# Patient Record
Sex: FEMALE | Race: WHITE | NOT HISPANIC OR LATINO | URBAN - METROPOLITAN AREA
[De-identification: names, ages, dates, MRNs, and addresses within clinical notes are randomized per-mention and may not be internally consistent; named-entity substitution may affect disease eponyms.]

---

## 2017-05-08 ENCOUNTER — EMERGENCY (EMERGENCY)
Facility: HOSPITAL | Age: 22
LOS: 0 days | Discharge: HOME | End: 2017-05-08
Admitting: PEDIATRICS

## 2017-06-28 DIAGNOSIS — R10.84 GENERALIZED ABDOMINAL PAIN: ICD-10-CM

## 2017-06-28 DIAGNOSIS — R10.13 EPIGASTRIC PAIN: ICD-10-CM

## 2018-03-13 ENCOUNTER — TRANSCRIPTION ENCOUNTER (OUTPATIENT)
Age: 23
End: 2018-03-13

## 2018-12-17 ENCOUNTER — INPATIENT (INPATIENT)
Facility: HOSPITAL | Age: 23
LOS: 0 days | Discharge: HOME | End: 2018-12-17
Attending: OBSTETRICS & GYNECOLOGY | Admitting: OBSTETRICS & GYNECOLOGY

## 2018-12-17 ENCOUNTER — TRANSCRIPTION ENCOUNTER (OUTPATIENT)
Age: 23
End: 2018-12-17

## 2018-12-17 VITALS
RESPIRATION RATE: 18 BRPM | SYSTOLIC BLOOD PRESSURE: 102 MMHG | TEMPERATURE: 96 F | HEART RATE: 68 BPM | OXYGEN SATURATION: 98 % | WEIGHT: 113.98 LBS | DIASTOLIC BLOOD PRESSURE: 63 MMHG

## 2018-12-17 VITALS
HEART RATE: 65 BPM | RESPIRATION RATE: 18 BRPM | DIASTOLIC BLOOD PRESSURE: 63 MMHG | SYSTOLIC BLOOD PRESSURE: 97 MMHG | OXYGEN SATURATION: 98 %

## 2018-12-17 LAB
ALBUMIN SERPL ELPH-MCNC: 4.1 G/DL — SIGNIFICANT CHANGE UP (ref 3.5–5.2)
ALP SERPL-CCNC: 42 U/L — SIGNIFICANT CHANGE UP (ref 30–115)
ALT FLD-CCNC: 12 U/L — SIGNIFICANT CHANGE UP (ref 0–41)
ANION GAP SERPL CALC-SCNC: 13 MMOL/L — SIGNIFICANT CHANGE UP (ref 7–14)
APPEARANCE UR: ABNORMAL
AST SERPL-CCNC: 18 U/L — SIGNIFICANT CHANGE UP (ref 0–41)
BACTERIA # UR AUTO: ABNORMAL /HPF
BASE EXCESS BLDV CALC-SCNC: -1.2 MMOL/L — SIGNIFICANT CHANGE UP (ref -2–2)
BASOPHILS # BLD AUTO: 0.04 K/UL — SIGNIFICANT CHANGE UP (ref 0–0.2)
BASOPHILS # BLD AUTO: 0.06 K/UL — SIGNIFICANT CHANGE UP (ref 0–0.2)
BASOPHILS NFR BLD AUTO: 0.3 % — SIGNIFICANT CHANGE UP (ref 0–1)
BASOPHILS NFR BLD AUTO: 0.4 % — SIGNIFICANT CHANGE UP (ref 0–1)
BILIRUB DIRECT SERPL-MCNC: <0.2 MG/DL — SIGNIFICANT CHANGE UP (ref 0–0.2)
BILIRUB INDIRECT FLD-MCNC: >0.2 MG/DL — SIGNIFICANT CHANGE UP (ref 0.2–1.2)
BILIRUB SERPL-MCNC: 0.4 MG/DL — SIGNIFICANT CHANGE UP (ref 0.2–1.2)
BILIRUB UR-MCNC: NEGATIVE — SIGNIFICANT CHANGE UP
BLD GP AB SCN SERPL QL: SIGNIFICANT CHANGE UP
BUN SERPL-MCNC: 12 MG/DL — SIGNIFICANT CHANGE UP (ref 10–20)
CA-I SERPL-SCNC: 1.23 MMOL/L — SIGNIFICANT CHANGE UP (ref 1.12–1.3)
CALCIUM SERPL-MCNC: 9.2 MG/DL — SIGNIFICANT CHANGE UP (ref 8.5–10.1)
CHLORIDE SERPL-SCNC: 102 MMOL/L — SIGNIFICANT CHANGE UP (ref 98–110)
CO2 SERPL-SCNC: 25 MMOL/L — SIGNIFICANT CHANGE UP (ref 17–32)
COLOR SPEC: SIGNIFICANT CHANGE UP
CREAT SERPL-MCNC: 0.6 MG/DL — LOW (ref 0.7–1.5)
DIFF PNL FLD: NEGATIVE — SIGNIFICANT CHANGE UP
EOSINOPHIL # BLD AUTO: 0.04 K/UL — SIGNIFICANT CHANGE UP (ref 0–0.7)
EOSINOPHIL # BLD AUTO: 0.06 K/UL — SIGNIFICANT CHANGE UP (ref 0–0.7)
EOSINOPHIL NFR BLD AUTO: 0.3 % — SIGNIFICANT CHANGE UP (ref 0–8)
EOSINOPHIL NFR BLD AUTO: 0.4 % — SIGNIFICANT CHANGE UP (ref 0–8)
ERYTHROCYTE [SEDIMENTATION RATE] IN BLOOD: 2 MM/HR — SIGNIFICANT CHANGE UP (ref 0–15)
GAS PNL BLDV: 139 MMOL/L — SIGNIFICANT CHANGE UP (ref 136–145)
GAS PNL BLDV: SIGNIFICANT CHANGE UP
GLUCOSE SERPL-MCNC: 122 MG/DL — HIGH (ref 70–99)
GLUCOSE UR QL: NEGATIVE — SIGNIFICANT CHANGE UP
HCO3 BLDV-SCNC: 24 MMOL/L — SIGNIFICANT CHANGE UP (ref 22–29)
HCT VFR BLD CALC: 32.5 % — LOW (ref 37–47)
HCT VFR BLD CALC: 34.9 % — LOW (ref 37–47)
HCT VFR BLD CALC: 38.3 % — SIGNIFICANT CHANGE UP (ref 37–47)
HCT VFR BLDA CALC: 37.2 % — SIGNIFICANT CHANGE UP (ref 34–44)
HGB BLD CALC-MCNC: 12.1 G/DL — LOW (ref 14–18)
HGB BLD-MCNC: 10.7 G/DL — LOW (ref 12–16)
HGB BLD-MCNC: 11.4 G/DL — LOW (ref 12–16)
HGB BLD-MCNC: 13 G/DL — SIGNIFICANT CHANGE UP (ref 12–16)
IMM GRANULOCYTES NFR BLD AUTO: 0.3 % — SIGNIFICANT CHANGE UP (ref 0.1–0.3)
IMM GRANULOCYTES NFR BLD AUTO: 0.4 % — HIGH (ref 0.1–0.3)
KETONES UR-MCNC: 40
LACTATE BLDV-MCNC: 1.2 MMOL/L — SIGNIFICANT CHANGE UP (ref 0.5–1.6)
LACTATE SERPL-SCNC: 0.7 MMOL/L — SIGNIFICANT CHANGE UP (ref 0.5–2.2)
LEUKOCYTE ESTERASE UR-ACNC: ABNORMAL
LIDOCAIN IGE QN: 27 U/L — SIGNIFICANT CHANGE UP (ref 7–60)
LYMPHOCYTES # BLD AUTO: 1.47 K/UL — SIGNIFICANT CHANGE UP (ref 1.2–3.4)
LYMPHOCYTES # BLD AUTO: 1.98 K/UL — SIGNIFICANT CHANGE UP (ref 1.2–3.4)
LYMPHOCYTES # BLD AUTO: 10.5 % — LOW (ref 20.5–51.1)
LYMPHOCYTES # BLD AUTO: 16.2 % — LOW (ref 20.5–51.1)
MCHC RBC-ENTMCNC: 28.9 PG — SIGNIFICANT CHANGE UP (ref 27–31)
MCHC RBC-ENTMCNC: 29.6 PG — SIGNIFICANT CHANGE UP (ref 27–31)
MCHC RBC-ENTMCNC: 29.9 PG — SIGNIFICANT CHANGE UP (ref 27–31)
MCHC RBC-ENTMCNC: 32.7 G/DL — SIGNIFICANT CHANGE UP (ref 32–37)
MCHC RBC-ENTMCNC: 32.9 G/DL — SIGNIFICANT CHANGE UP (ref 32–37)
MCHC RBC-ENTMCNC: 33.9 G/DL — SIGNIFICANT CHANGE UP (ref 32–37)
MCV RBC AUTO: 88 FL — SIGNIFICANT CHANGE UP (ref 81–99)
MCV RBC AUTO: 88.6 FL — SIGNIFICANT CHANGE UP (ref 81–99)
MCV RBC AUTO: 90 FL — SIGNIFICANT CHANGE UP (ref 81–99)
MONOCYTES # BLD AUTO: 0.77 K/UL — HIGH (ref 0.1–0.6)
MONOCYTES # BLD AUTO: 0.82 K/UL — HIGH (ref 0.1–0.6)
MONOCYTES NFR BLD AUTO: 5.5 % — SIGNIFICANT CHANGE UP (ref 1.7–9.3)
MONOCYTES NFR BLD AUTO: 6.7 % — SIGNIFICANT CHANGE UP (ref 1.7–9.3)
NEUTROPHILS # BLD AUTO: 11.62 K/UL — HIGH (ref 1.4–6.5)
NEUTROPHILS # BLD AUTO: 9.34 K/UL — HIGH (ref 1.4–6.5)
NEUTROPHILS NFR BLD AUTO: 76.2 % — HIGH (ref 42.2–75.2)
NEUTROPHILS NFR BLD AUTO: 82.8 % — HIGH (ref 42.2–75.2)
NITRITE UR-MCNC: NEGATIVE — SIGNIFICANT CHANGE UP
NRBC # BLD: 0 /100 WBCS — SIGNIFICANT CHANGE UP (ref 0–0)
PCO2 BLDV: 42 MMHG — SIGNIFICANT CHANGE UP (ref 41–51)
PH BLDV: 7.36 — SIGNIFICANT CHANGE UP (ref 7.26–7.43)
PH UR: 5.5 — SIGNIFICANT CHANGE UP (ref 5–8)
PLATELET # BLD AUTO: 137 K/UL — SIGNIFICANT CHANGE UP (ref 130–400)
PLATELET # BLD AUTO: 196 K/UL — SIGNIFICANT CHANGE UP (ref 130–400)
PLATELET # BLD AUTO: 229 K/UL — SIGNIFICANT CHANGE UP (ref 130–400)
PO2 BLDV: 36 MMHG — SIGNIFICANT CHANGE UP (ref 20–40)
POTASSIUM BLDV-SCNC: 3.9 MMOL/L — SIGNIFICANT CHANGE UP (ref 3.3–5.6)
POTASSIUM SERPL-MCNC: 4.1 MMOL/L — SIGNIFICANT CHANGE UP (ref 3.5–5)
POTASSIUM SERPL-SCNC: 4.1 MMOL/L — SIGNIFICANT CHANGE UP (ref 3.5–5)
PROT SERPL-MCNC: 6.7 G/DL — SIGNIFICANT CHANGE UP (ref 6–8)
PROT UR-MCNC: ABNORMAL
RBC # BLD: 3.61 M/UL — LOW (ref 4.2–5.4)
RBC # BLD: 3.94 M/UL — LOW (ref 4.2–5.4)
RBC # BLD: 4.35 M/UL — SIGNIFICANT CHANGE UP (ref 4.2–5.4)
RBC # FLD: 11.8 % — SIGNIFICANT CHANGE UP (ref 11.5–14.5)
RBC # FLD: 11.9 % — SIGNIFICANT CHANGE UP (ref 11.5–14.5)
RBC # FLD: 11.9 % — SIGNIFICANT CHANGE UP (ref 11.5–14.5)
SAO2 % BLDV: 67 % — SIGNIFICANT CHANGE UP
SODIUM SERPL-SCNC: 140 MMOL/L — SIGNIFICANT CHANGE UP (ref 135–146)
SP GR SPEC: >=1.03 — SIGNIFICANT CHANGE UP (ref 1.01–1.03)
TYPE + AB SCN PNL BLD: SIGNIFICANT CHANGE UP
UROBILINOGEN FLD QL: 0.2 — SIGNIFICANT CHANGE UP (ref 0.2–0.2)
WBC # BLD: 12.26 K/UL — HIGH (ref 4.8–10.8)
WBC # BLD: 14.04 K/UL — HIGH (ref 4.8–10.8)
WBC # BLD: 14.55 K/UL — HIGH (ref 4.8–10.8)
WBC # FLD AUTO: 12.26 K/UL — HIGH (ref 4.8–10.8)
WBC # FLD AUTO: 14.04 K/UL — HIGH (ref 4.8–10.8)
WBC # FLD AUTO: 14.55 K/UL — HIGH (ref 4.8–10.8)
WBC UR QL: SIGNIFICANT CHANGE UP /HPF

## 2018-12-17 RX ORDER — SODIUM CHLORIDE 9 MG/ML
1000 INJECTION, SOLUTION INTRAVENOUS
Qty: 0 | Refills: 0 | Status: DISCONTINUED | OUTPATIENT
Start: 2018-12-17 | End: 2018-12-17

## 2018-12-17 RX ORDER — ONDANSETRON 8 MG/1
4 TABLET, FILM COATED ORAL ONCE
Qty: 0 | Refills: 0 | Status: DISCONTINUED | OUTPATIENT
Start: 2018-12-17 | End: 2018-12-17

## 2018-12-17 RX ORDER — CEFOTETAN DISODIUM 1 G
2 VIAL (EA) INJECTION ONCE
Qty: 0 | Refills: 0 | Status: COMPLETED | OUTPATIENT
Start: 2018-12-17 | End: 2018-12-17

## 2018-12-17 RX ORDER — KETOROLAC TROMETHAMINE 30 MG/ML
30 SYRINGE (ML) INJECTION ONCE
Qty: 0 | Refills: 0 | Status: DISCONTINUED | OUTPATIENT
Start: 2018-12-17 | End: 2018-12-17

## 2018-12-17 RX ORDER — DOCUSATE SODIUM 100 MG
1 CAPSULE ORAL
Qty: 60 | Refills: 1 | OUTPATIENT
Start: 2018-12-17 | End: 2019-02-14

## 2018-12-17 RX ORDER — MORPHINE SULFATE 50 MG/1
4 CAPSULE, EXTENDED RELEASE ORAL
Qty: 0 | Refills: 0 | Status: DISCONTINUED | OUTPATIENT
Start: 2018-12-17 | End: 2018-12-17

## 2018-12-17 RX ORDER — DEXAMETHASONE 0.5 MG/5ML
8 ELIXIR ORAL ONCE
Qty: 0 | Refills: 0 | Status: DISCONTINUED | OUTPATIENT
Start: 2018-12-17 | End: 2018-12-17

## 2018-12-17 RX ORDER — ONDANSETRON 8 MG/1
4 TABLET, FILM COATED ORAL ONCE
Qty: 0 | Refills: 0 | Status: COMPLETED | OUTPATIENT
Start: 2018-12-17 | End: 2018-12-17

## 2018-12-17 RX ORDER — MORPHINE SULFATE 50 MG/1
2 CAPSULE, EXTENDED RELEASE ORAL
Qty: 0 | Refills: 0 | Status: DISCONTINUED | OUTPATIENT
Start: 2018-12-17 | End: 2018-12-17

## 2018-12-17 RX ORDER — METRONIDAZOLE 500 MG
500 TABLET ORAL ONCE
Qty: 0 | Refills: 0 | Status: COMPLETED | OUTPATIENT
Start: 2018-12-17 | End: 2018-12-17

## 2018-12-17 RX ORDER — MEPERIDINE HYDROCHLORIDE 50 MG/ML
12.5 INJECTION INTRAMUSCULAR; INTRAVENOUS; SUBCUTANEOUS
Qty: 0 | Refills: 0 | Status: DISCONTINUED | OUTPATIENT
Start: 2018-12-17 | End: 2018-12-17

## 2018-12-17 RX ORDER — SODIUM CHLORIDE 9 MG/ML
1000 INJECTION, SOLUTION INTRAVENOUS ONCE
Qty: 0 | Refills: 0 | Status: COMPLETED | OUTPATIENT
Start: 2018-12-17 | End: 2018-12-17

## 2018-12-17 RX ORDER — KETOROLAC TROMETHAMINE 30 MG/ML
15 SYRINGE (ML) INJECTION ONCE
Qty: 0 | Refills: 0 | Status: DISCONTINUED | OUTPATIENT
Start: 2018-12-17 | End: 2018-12-17

## 2018-12-17 RX ADMIN — Medication 100 GRAM(S): at 12:42

## 2018-12-17 RX ADMIN — Medication 110 MILLIGRAM(S): at 13:18

## 2018-12-17 RX ADMIN — ONDANSETRON 4 MILLIGRAM(S): 8 TABLET, FILM COATED ORAL at 03:55

## 2018-12-17 RX ADMIN — SODIUM CHLORIDE 120 MILLILITER(S): 9 INJECTION, SOLUTION INTRAVENOUS at 17:11

## 2018-12-17 RX ADMIN — Medication 30 MILLIGRAM(S): at 03:56

## 2018-12-17 RX ADMIN — Medication 15 MILLIGRAM(S): at 08:23

## 2018-12-17 RX ADMIN — SODIUM CHLORIDE 1000 MILLILITER(S): 9 INJECTION, SOLUTION INTRAVENOUS at 03:55

## 2018-12-17 RX ADMIN — Medication 100 MILLIGRAM(S): at 14:33

## 2018-12-17 NOTE — CONSULT NOTE ADULT - SUBJECTIVE AND OBJECTIVE BOX
JACKI MUÑOZ   23y   Female      Chief Complaint:    HPI: 23y yo nulligravid LMP x3wks ago presents with RLQ pain.  Pt reports pain started suddenly at 2AM when she was sitting still, 9/10 stabbing type of pain, radiating to her back, a/w 2 episodes of diarrhea.  Denies nausea/vomiting, fevers/chills . Denies vaginal discharge or bleeding.  Denies sick contacts.  Pt does not have history of known ovarian cysts, had a pelvic sonogram 6/2018 with no findings per patient.       PAST MEDICAL & SURGICAL HISTORY:  No pertinent past medical history  No significant past surgical history      OB/GYN HISTORY:     LMP: 11/26/18, regular cycles, denies heavy bleeding moderate pain prior to start of menses  Gyn: denies history of abnormal pap, STI, ovarian cysts, or uterine fibroids  Obstetric: nulligravid  Last Pap smear:  11/2018 normal per patient  Last mammogram: n/a  Last Colonoscopy: n/a    FAMILY HISTORY: noncontributory      SOCIAL HISTORY: Denies cigarette use, alcohol, or other drugs    ALLERGIES: No Known Allergies    MEDICATIONS:   (Floorstock)   1 Each (08:21)    (Floorstock)   1 Each (03:53)    ketorolac   Injectable   15 milliGRAM(s) (08:23)    ketorolac   Injectable   30 milliGRAM(s) (03:56)    lactated ringers Bolus   1000 mL/Hr (03:55)    ondansetron Injectable   4 milliGRAM(s) (03:55)        Review of Systems: see HPI    Vital Signs Last 24 Hrs  T(C): 36.3 (17 Dec 2018 07:35), Max: 37 (17 Dec 2018 05:02)  T(F): 97.4 (17 Dec 2018 07:35), Max: 98.6 (17 Dec 2018 05:02)  HR: 73 (17 Dec 2018 07:35) (68 - 73)  BP: 100/57 (17 Dec 2018 07:35) (100/57 - 102/63)  RR: 18 (17 Dec 2018 07:35) (18 - 18)  SpO2: 98% (17 Dec 2018 07:35) (98% - 98%)    PHYSICAL EXAM:      Constitutional: NAD    Breasts: deferred    Respiratory: CTAB    Cardiovascular: NL S1/S2, no M/R/G    Gastrointestinal: guarding, moderate tenderness diffusely, worse in RL and RUQ, nondistended, guarding, no rebound or rigidity    Pelvic: Normal vulva, normal vagina, no bleeding, Cervix normal, closed, no bleeding, no abnormal discharge, no CMT, Uterus anteverted, normal sized, moderately tender diffusely on bimanual exam  Rectal: deferred    LABS:                        13.0   14.04 )-----------( 229      ( 17 Dec 2018 03:45 )             38.3       12-17    140  |  102  |  12  ----------------------------<  122<H>  4.1   |  25  |  0.6<L>    Ca    9.2      17 Dec 2018 03:45    TPro  6.7  /  Alb  4.1  /  TBili  0.4  /  DBili  <0.2  /  AST  18  /  ALT  12  /  AlkPhos  42  12-17      Urinalysis Basic - ( 17 Dec 2018 03:45 )    Color: Dark Yellow / Appearance: Cloudy / SG: >=1.030 / pH: x  Gluc: x / Ketone: 40  / Bili: Negative / Urobili: 0.2   Blood: x / Protein: Trace / Nitrite: Negative   Leuk Esterase: Trace / RBC: x / WBC 1-2 /HPF   Sq Epi: x / Non Sq Epi: x / Bacteria: Few /HPF      RADIOLOGY & ADDITIONAL STUDIES:  < from: US Pelvis Complete (12.17.18 @ 07:01) >  Findings:  Uterus: 8 cm long axis, 5.1 cm transverse, 3.7 cm AP.  Endometrial stripe: 0.8 mm.  The uterus is normal in morphology and echogenicity.     Within the right adnexa, the there is a complex mass measuring 5.2 x 4.1   x 3.4 cm with internal vascularity.    The left ovary measures 3.1 x 2.6 x 1.4 cm and contains subcentimeter   follicles.    There is an abnormal amount of complex free fluid within the pelvis.      Impression:    5.2cm complex right adnexal mass without visualization of a normal   ovary. Differential diagnosis includes a tubo-ovarian abscess versus   ovarian torsion. Tubo-ovarian abscess is favored as there appears to be   significant vascularity within the mass.    Abnormal amount of complex free fluid within the pelvis.    Normal uterus and left ovary.    < end of copied text > JACKI MUÑOZ   23y   Female      Chief Complaint:    HPI: 23y yo nulligravid LMP x3wks ago presents with RLQ pain.  Pt reports pain started suddenly at 2AM when she was sitting still, 9/10 stabbing type of pain, radiating to her back, a/w 2 episodes of diarrhea.  Denies nausea/vomiting, fevers/chills . Denies vaginal discharge or bleeding.  Denies sick contacts.  Pt does not have history of known ovarian cysts, had a pelvic sonogram 6/2018 with no findings per patient.       PAST MEDICAL & SURGICAL HISTORY:  No pertinent past medical history  No significant past surgical history      OB/GYN HISTORY:     LMP: 11/26/18, regular cycles, denies heavy bleeding moderate pain prior to start of menses  Gyn: denies history of abnormal pap, STI, ovarian cysts, or uterine fibroids  Obstetric: nulligravid  Last Pap smear:  11/2018 normal per patient  Last mammogram: n/a  Last Colonoscopy: n/a    FAMILY HISTORY: noncontributory      SOCIAL HISTORY: Denies cigarette use, alcohol, or other drugs    ALLERGIES: No Known Allergies    MEDICATIONS:   (Floorstock)   1 Each (08:21)    (Floorstock)   1 Each (03:53)    ketorolac   Injectable   15 milliGRAM(s) (08:23)    ketorolac   Injectable   30 milliGRAM(s) (03:56)    lactated ringers Bolus   1000 mL/Hr (03:55)    ondansetron Injectable   4 milliGRAM(s) (03:55)        Review of Systems: see HPI    Vital Signs Last 24 Hrs  T(C): 36.3 (17 Dec 2018 07:35), Max: 37 (17 Dec 2018 05:02)  T(F): 97.4 (17 Dec 2018 07:35), Max: 98.6 (17 Dec 2018 05:02)  HR: 73 (17 Dec 2018 07:35) (68 - 73)  BP: 100/57 (17 Dec 2018 07:35) (100/57 - 102/63)  RR: 18 (17 Dec 2018 07:35) (18 - 18)  SpO2: 98% (17 Dec 2018 07:35) (98% - 98%)    PHYSICAL EXAM:      Constitutional: NAD    Breasts: deferred    Respiratory: CTAB    Cardiovascular: NL S1/S2, no M/R/G    Gastrointestinal: guarding, moderate tenderness diffusely, worse in RL and RUQ, nondistended, guarding, no rebound or rigidity    Pelvic: Normal vulva, normal vagina, no bleeding, Cervix normal, closed, no bleeding, no abnormal discharge, no CMT, GC/Chl and vaginitis cultures collected, Uterus anteverted, normal sized, moderately tender diffusely on bimanual exam    Rectal: deferred    LABS:                        13.0   14.04 )-----------( 229      ( 17 Dec 2018 03:45 )             38.3       12-17    140  |  102  |  12  ----------------------------<  122<H>  4.1   |  25  |  0.6<L>    Ca    9.2      17 Dec 2018 03:45    TPro  6.7  /  Alb  4.1  /  TBili  0.4  /  DBili  <0.2  /  AST  18  /  ALT  12  /  AlkPhos  42  12-17      Urinalysis Basic - ( 17 Dec 2018 03:45 )    Color: Dark Yellow / Appearance: Cloudy / SG: >=1.030 / pH: x  Gluc: x / Ketone: 40  / Bili: Negative / Urobili: 0.2   Blood: x / Protein: Trace / Nitrite: Negative   Leuk Esterase: Trace / RBC: x / WBC 1-2 /HPF   Sq Epi: x / Non Sq Epi: x / Bacteria: Few /HPF      RADIOLOGY & ADDITIONAL STUDIES:  < from: US Pelvis Complete (12.17.18 @ 07:01) >  Findings:  Uterus: 8 cm long axis, 5.1 cm transverse, 3.7 cm AP.  Endometrial stripe: 0.8 mm.  The uterus is normal in morphology and echogenicity.     Within the right adnexa, the there is a complex mass measuring 5.2 x 4.1   x 3.4 cm with internal vascularity.    The left ovary measures 3.1 x 2.6 x 1.4 cm and contains subcentimeter   follicles.    There is an abnormal amount of complex free fluid within the pelvis.      Impression:    5.2cm complex right adnexal mass without visualization of a normal   ovary. Differential diagnosis includes a tubo-ovarian abscess versus   ovarian torsion. Tubo-ovarian abscess is favored as there appears to be   significant vascularity within the mass.    Abnormal amount of complex free fluid within the pelvis.    Normal uterus and left ovary.    < end of copied text > JACKI MUÑOZ   23y   Female      Chief Complaint:    HPI: 23y yo nulligravid LMP x3wks ago presents with RLQ pain.  Pt reports pain started suddenly at 2AM when she was sitting still, 9/10 stabbing type of pain, radiating to her back, a/w 2 episodes of diarrhea.  Denies nausea/vomiting, fevers/chills . Denies vaginal discharge or bleeding.  Denies sick contacts.  Pt does not have history of known ovarian cysts, had a pelvic sonogram 6/2018 with no findings per patient.       PAST MEDICAL & SURGICAL HISTORY:  No pertinent past medical history  No significant past surgical history      OB/GYN HISTORY:     LMP: 11/26/18, regular cycles, denies heavy bleeding moderate pain prior to start of menses  Gyn: denies history of abnormal pap, STI, ovarian cysts, or uterine fibroids  Obstetric: nulligravid  Last Pap smear:  11/2018 normal per patient  Last mammogram: n/a  Last Colonoscopy: n/a    FAMILY HISTORY: noncontributory      SOCIAL HISTORY: Denies cigarette use, alcohol, or other drugs    ALLERGIES: No Known Allergies    MEDICATIONS:   (Floorstock)   1 Each (08:21)    (Floorstock)   1 Each (03:53)    ketorolac   Injectable   15 milliGRAM(s) (08:23)    ketorolac   Injectable   30 milliGRAM(s) (03:56)    lactated ringers Bolus   1000 mL/Hr (03:55)    ondansetron Injectable   4 milliGRAM(s) (03:55)        Review of Systems: see HPI    Vital Signs Last 24 Hrs  T(C): 36.3 (17 Dec 2018 07:35), Max: 37 (17 Dec 2018 05:02)  T(F): 97.4 (17 Dec 2018 07:35), Max: 98.6 (17 Dec 2018 05:02)  HR: 73 (17 Dec 2018 07:35) (68 - 73)  BP: 100/57 (17 Dec 2018 07:35) (100/57 - 102/63)  RR: 18 (17 Dec 2018 07:35) (18 - 18)  SpO2: 98% (17 Dec 2018 07:35) (98% - 98%)    PHYSICAL EXAM:      Constitutional: NAD    Breasts: deferred    Respiratory: CTAB    Cardiovascular: NL S1/S2, no M/R/G    Gastrointestinal: guarding, moderate tenderness diffusely, worse in RL and RUQ, nondistended, guarding, no rebound or rigidity    Pelvic: Normal vulva, normal vagina, no bleeding, Cervix normal, closed, no bleeding, no abnormal discharge, no CMT, GC/Chl and vaginitis cultures collected, Uterus anteverted, normal sized, moderately tender diffusely on bimanual exam    Rectal: deferred    LABS:                        13.0   14.04 )-----------( 229      ( 17 Dec 2018 03:45 )             38.3       12-17    140  |  102  |  12  ----------------------------<  122<H>  4.1   |  25  |  0.6<L>    Ca    9.2      17 Dec 2018 03:45    TPro  6.7  /  Alb  4.1  /  TBili  0.4  /  DBili  <0.2  /  AST  18  /  ALT  12  /  AlkPhos  42  12-17      Urinalysis Basic - ( 17 Dec 2018 03:45 )    Color: Dark Yellow / Appearance: Cloudy / SG: >=1.030 / pH: x  Gluc: x / Ketone: 40  / Bili: Negative / Urobili: 0.2   Blood: x / Protein: Trace / Nitrite: Negative   Leuk Esterase: Trace / RBC: x / WBC 1-2 /HPF   Sq Epi: x / Non Sq Epi: x / Bacteria: Few /HPF    Urine pregnancy test negative      RADIOLOGY & ADDITIONAL STUDIES:  < from: US Pelvis Complete (12.17.18 @ 07:01) >  Findings:  Uterus: 8 cm long axis, 5.1 cm transverse, 3.7 cm AP.  Endometrial stripe: 0.8 mm.  The uterus is normal in morphology and echogenicity.     Within the right adnexa, the there is a complex mass measuring 5.2 x 4.1   x 3.4 cm with internal vascularity.    The left ovary measures 3.1 x 2.6 x 1.4 cm and contains subcentimeter   follicles.    There is an abnormal amount of complex free fluid within the pelvis.      Impression:    5.2cm complex right adnexal mass without visualization of a normal   ovary. Differential diagnosis includes a tubo-ovarian abscess versus   ovarian torsion. Tubo-ovarian abscess is favored as there appears to be   significant vascularity within the mass.    Abnormal amount of complex free fluid within the pelvis.    Normal uterus and left ovary.    < end of copied text > JACKI MUÑOZ   23y   Female      Chief Complaint:    HPI: 23y yo nulligravid LMP x3wks ago presents with RLQ pain.  Pt reports pain started suddenly at 2AM when she was sitting still, 9/10 stabbing type of pain, radiating to her back, a/w 2 episodes of diarrhea.  Denies nausea/vomiting, fevers/chills. Denies vaginal discharge or bleeding.  Denies sick contacts.  Pt does not have history of known ovarian cysts, had a pelvic sonogram 6/2018 with no findings per patient.     PAST MEDICAL & SURGICAL HISTORY:  No pertinent past medical history  No significant past surgical history    OB/GYN HISTORY:     LMP: 11/26/18, regular cycles, denies heavy bleeding moderate pain prior to start of menses  Gyn: denies history of abnormal pap, STI, ovarian cysts, or uterine fibroids  Obstetric: nulligravid  Last Pap smear:  11/2018 normal per patient    FAMILY HISTORY: noncontributory    SOCIAL HISTORY: Denies cigarette use, alcohol, or other drugs    ALLERGIES: No Known Allergies    MEDICATIONS:   (Floorstock)   1 Each (08:21)    (Floorstock)   1 Each (03:53)    ketorolac   Injectable   15 milliGRAM(s) (08:23)    ketorolac   Injectable   30 milliGRAM(s) (03:56)    lactated ringers Bolus   1000 mL/Hr (03:55)    ondansetron Injectable   4 milliGRAM(s) (03:55)    Review of Systems: see HPI    Vital Signs Last 24 Hrs  T(C): 36.3 (17 Dec 2018 07:35), Max: 37 (17 Dec 2018 05:02)  T(F): 97.4 (17 Dec 2018 07:35), Max: 98.6 (17 Dec 2018 05:02)  HR: 73 (17 Dec 2018 07:35) (68 - 73)  BP: 100/57 (17 Dec 2018 07:35) (100/57 - 102/63)  RR: 18 (17 Dec 2018 07:35) (18 - 18)  SpO2: 98% (17 Dec 2018 07:35) (98% - 98%)    PHYSICAL EXAM:    Constitutional: NAD    Breasts: deferred    Respiratory: CTAB    Cardiovascular: NL S1/S2, no M/R/G    Gastrointestinal: soft, lower abdominal tenderness, worse in RLQ, nondistended, no rebound or rigidity    Pelvic: Normal vulva, normal vagina, no bleeding, Cervix normal, closed, no bleeding, no abnormal discharge, no CMT, GC/Chl and vaginitis cultures collected, Uterus anteverted, normal sized, moderately tender diffusely on bimanual exam    LABS:                        13.0   14.04 )-----------( 229      ( 17 Dec 2018 03:45 )             38.3       12-17    140  |  102  |  12  ----------------------------<  122<H>  4.1   |  25  |  0.6<L>    Ca    9.2      17 Dec 2018 03:45    TPro  6.7  /  Alb  4.1  /  TBili  0.4  /  DBili  <0.2  /  AST  18  /  ALT  12  /  AlkPhos  42  12-17      Urinalysis Basic - ( 17 Dec 2018 03:45 )    Color: Dark Yellow / Appearance: Cloudy / SG: >=1.030 / pH: x  Gluc: x / Ketone: 40  / Bili: Negative / Urobili: 0.2   Blood: x / Protein: Trace / Nitrite: Negative   Leuk Esterase: Trace / RBC: x / WBC 1-2 /HPF   Sq Epi: x / Non Sq Epi: x / Bacteria: Few /HPF    Urine pregnancy test negative      RADIOLOGY & ADDITIONAL STUDIES:  < from: US Pelvis Complete (12.17.18 @ 07:01) >  Findings:  Uterus: 8 cm long axis, 5.1 cm transverse, 3.7 cm AP.  Endometrial stripe: 0.8 mm.  The uterus is normal in morphology and echogenicity.     Within the right adnexa, the there is a complex mass measuring 5.2 x 4.1   x 3.4 cm with internal vascularity.    The left ovary measures 3.1 x 2.6 x 1.4 cm and contains subcentimeter   follicles.    There is an abnormal amount of complex free fluid within the pelvis.      Impression:    5.2cm complex right adnexal mass without visualization of a normal   ovary. Differential diagnosis includes a tubo-ovarian abscess versus   ovarian torsion. Tubo-ovarian abscess is favored as there appears to be   significant vascularity within the mass.    Abnormal amount of complex free fluid within the pelvis.    Normal uterus and left ovary.    < end of copied text >

## 2018-12-17 NOTE — ED PROVIDER NOTE - PHYSICAL EXAMINATION
Physical Exam    Vital Signs: I have reviewed the initial vital signs  Constitutional: well-nourished, appears stated age, no acute distress  HEENT: Conjunctiva pink, Sclera clear, PERRLA, EOMI. Mucous membranes moist, no exudates or lesions noted, uvula midline.   Cardiovascular: S1 and S2 present, regular rate, regular rhythm. radial pulses equal and 2+ No peripheral edema  Respiratory: unlabored respiratory effort, clear to auscultation bilaterally no wheezing, rales and rhonchi  Gastrointestinal: mild suprapubic/RLQ TTP, no pulsatile mass, active bowel sounds in all 4 quadrants. No guarding or rebound tenderness  Musculoskeletal: supple nontender neck, no midline tenderness, no joint pain. - CVA tenderness bilaterally  Integumentary: warm, dry, no rash  Psychiatric: appropriate mood, appropriate affect

## 2018-12-17 NOTE — DISCHARGE NOTE ADULT - NSFOLLOWUPCOMMENTS_ALL_CORE_SIUH
Please follow up with Dr Harrington in 2 weeks, [phone] [facility] [address] [place] [address]

## 2018-12-17 NOTE — H&P ADULT - ASSESSMENT
24 y/o nulligravid with RLQ pain and R ovarian mass and complex free pelvic fluid, ovarian torsion vs ruptured ovarian vs TOA,  -NPO  -IV fluids  -on call to OR  -gyn to follow vaginitis panel and GC/Chl cultures    Dr Harrington aware

## 2018-12-17 NOTE — PACU DISCHARGE NOTE - COMMENTS
Patient had smooth intraoperative course, no event, no anesthesia complications.           Please discharge patient when criteria met.

## 2018-12-17 NOTE — DISCHARGE NOTE ADULT - CARE PROVIDER_API CALL
Jared Harrington), OBSGYN  Physicians  [address] [place] [address]  Phone: [phone]  Fax: [phone]

## 2018-12-17 NOTE — BRIEF OPERATIVE NOTE - POST-OP DX
Hemoperitoneum  12/17/2018    Active  Selma Magallon  Hemorrhagic cyst of right ovary  12/17/2018  ruptured, hemoperitoneum  Active  Selma Magallon

## 2018-12-17 NOTE — BRIEF OPERATIVE NOTE - PROCEDURE
<<-----Click on this checkbox to enter Procedure Evacuation of hemoperitoneum  12/17/2018    Active  [email or web address]  Diagnostic laparoscopy for adnexal masses  12/17/2018    Active  [email or web address]

## 2018-12-17 NOTE — ED PROVIDER NOTE - NS ED ROS FT
Review of Systems         Constitutional: (-) fever (-) chills (-) weakness       EENT: (-) sore throat       Cardiovascular: (-) chest pain (-) syncope       Respiratory: (-) cough, (-) shortness of breath       Gastrointestinal: (+) abdominal pain (-) vomiting (-) diarrhea (-) nausea       Genitourinary: (-) dysuria  (-) hematuria       Musculoskeletal: (-) neck pain (-) back pain (-) joint pain       Integumentary: (-) rash       Neurological: (-) headache (-) altered mental status (-) dizziness (-) paresthesias       Psych: (-) psych history

## 2018-12-17 NOTE — SBIRT NOTE. - NSSBIRTSERVICES_GEN_A_ED_FT
Provided SBIRT services: Full Screen Negative    Positive reinforcement provided given patient currently within healthy guidelines. Education materials reviewed and given to patient.    AUDIT Score: 3  DAST-10 Score: 0  Duration = 5 Minutes

## 2018-12-17 NOTE — ED PROVIDER NOTE - OBJECTIVE STATEMENT
23 year old female presenting with sudden RLQ pain x 8 hours. Patient states the pain began suddenly 8 hours ago, No vomiting or nausea but had diarrhea 2 hours before the pain began and admits to some chills. Denies dysuria, vaginal discharge, hematuria, hematemesis, melena, back pain, fever, URI sxs, HA, hematuria. No hx of abdominal surgeries, on OCP, sexually active. No STD hx.

## 2018-12-17 NOTE — H&P ADULT - ATTENDING COMMENTS
WBC decreasing, CRP/ ESR neg. Imaging inconclusive of torsion vs TOA. Images have been reviewed with radiology.  Patient still reports RLQ pain.  Diagnostic laparoscopy and surgical management was recommended.  Patient and her mother desired initially to sign out AMA and go to [facility] were mother works as an RN.  After discussing the clinical scenario, patient and her family agreed with the plan to proceed with laparoscopy here.  Risks( including but not limited to pain, infection, bleeding, blood transfusion, damage to the surrounding tissue and need for their repair, DVT/ PE), benefits and alternatives discussed in details. All questions answered. Patient verbalized understanding.  On call for diagnostic laparoscopy, possible cystectomy, possible salpingo-oophorectomy, possible laparotomy.

## 2018-12-17 NOTE — ED PROVIDER NOTE - CHPI ED SYMPTOMS NEG
no vomiting/no burning urination/no dysuria/no hematuria/no blood in stool/no fever/no nausea/no abdominal distension

## 2018-12-17 NOTE — ED ADULT NURSE NOTE - NSIMPLEMENTINTERV_GEN_ALL_ED
Implemented All Universal Safety Interventions:  [place] to call system. Call bell, personal items and telephone within reach. Instruct patient to call for assistance. Room bathroom lighting operational. Non-slip footwear when patient is off stretcher. Physically safe environment: no spills, clutter or unnecessary equipment. Stretcher in lowest position, wheels locked, appropriate side rails in place.

## 2018-12-17 NOTE — H&P ADULT - HISTORY OF PRESENT ILLNESS
23y yo nulligravid LMP x3wks ago presents with RLQ pain.  Pt reports pain started suddenly at 2AM when she was sitting still, 9/10 stabbing type of pain, radiating to her back, a/w 2 episodes of diarrhea.  Denies nausea/vomiting, fevers/chills. Denies vaginal discharge or bleeding.  Denies sick contacts.  Pt does not have history of known ovarian cysts, had a pelvic sonogram 6/2018 with no findings per patient.  Since being in the ED, patients pain has remained 6/10, no nausea/no vomiting.

## 2018-12-17 NOTE — DISCHARGE NOTE ADULT - CARE PLAN
Principal Discharge DX:	Cyst of right ovary  Goal:	healthy recovery  Assessment and plan of treatment:	keep incision clean and dry, wash with soap and water, take ibuprofen or percocet every 4-6 hours as needed for pain

## 2018-12-17 NOTE — ED PROVIDER NOTE - PROGRESS NOTE DETAILS
Spoke with [facility] to take patient, peds patient higher priority, will see patient after Attempted to call u/s no answer [facility] still not picking up, imaging delayed. US w/ sig free fluid on ED read, awaiting radiology read. Pt signed out to day team, including RUBENS George and MD Espinosa. US w/ sig free fluid on ED read, awaiting radiology read. Upreg neg.  Pt with mild to moderate abd pain, but abd soft, not peritonitic.  Pt signed out to day team, including RUBENS George and MD Espinosa. Discussed with GYN resident will see pt in ED. At approx 0835 received sign-out on pt, at approx 0840, called from radiology, GYN aware of conult.    Due to the need for continuous patient care in the emergency department, the times documented are the time of computer entry, not necessarily the one the event occurred. d/w gyn, request CT scan CT results noted, GYN notified d/w GYN who impression is pt has TOA d/w GYN who impression is pt likely has TOA, rec abx, unlikely torsion but cannot exclude, would like pt admitted for serial exams. no OR at this time.

## 2018-12-17 NOTE — BRIEF OPERATIVE NOTE - OPERATION/FINDINGS
ruptured hemorrhagic cyst of right ovary, hemoperitoneum approximately 200cc evacuated, no active bleeding ruptured hemorrhagic cyst of right ovary, hemoperitoneum approximately 200cc evacuated, no active bleeding.

## 2018-12-17 NOTE — PRE-OP CHECKLIST - RESPIRATORY RATE (BREATHS/MIN)
Problem: Patient Care Overview  Goal: Plan of Care Review  Outcome: Outcome(s) achieved Date Met:  02/17/17  Vital signs stable, afebrile; NST completed; patient reports + fetal movement, denies contractions.pain; new monitor given and patient knows to continue c blood glucose log; patient to get controls to calibrate monitor and understands to report to ; discharge instructions given and discharged to home       18

## 2018-12-17 NOTE — ED ADULT TRIAGE NOTE - CHIEF COMPLAINT QUOTE
Pt p/w R sided abd pain since 9pm. pt denies v/d. pt stated the pain is worse when standing and moving. pt has no pmhX

## 2018-12-17 NOTE — H&P ADULT - NSHPLABSRESULTS_GEN_ALL_CORE
13.0   14.04 )-----------( 229      ( 17 Dec 2018 03:45 )             38.3                11.4  12.26 )-----------( 196    (17 Dec 2018) 10:03             34.9         12-17    140  |  102  |  12  ----------------------------<  122<H>  4.1   |  25  |  0.6<L>    Ca    9.2      17 Dec 2018 03:45    TPro  6.7  /  Alb  4.1  /  TBili  0.4  /  DBili  <0.2  /  AST  18  /  ALT  12  /  AlkPhos  42  12-17      Urinalysis Basic - ( 17 Dec 2018 03:45 )    Color: Dark Yellow / Appearance: Cloudy / SG: >=1.030 / pH: x  Gluc: x / Ketone: 40  / Bili: Negative / Urobili: 0.2   Blood: x / Protein: Trace / Nitrite: Negative   Leuk Esterase: Trace / RBC: x / WBC 1-2 /HPF   Sq Epi: x / Non Sq Epi: x / Bacteria: Few /HPF    Urine pregnancy test negative      RADIOLOGY & ADDITIONAL STUDIES:  < from: US Pelvis Complete (12.17.18 @ 07:01) >  Findings:  Uterus: 8 cm long axis, 5.1 cm transverse, 3.7 cm AP.  Endometrial stripe: 0.8 mm.  The uterus is normal in morphology and echogenicity.     Within the right adnexa, the there is a complex mass measuring 5.2 x 4.1   x 3.4 cm with internal vascularity.    The left ovary measures 3.1 x 2.6 x 1.4 cm and contains subcentimeter   follicles.    There is an abnormal amount of complex free fluid within the pelvis.      Impression:    5.2cm complex right adnexal mass without visualization of a normal   ovary. Differential diagnosis includes a tubo-ovarian abscess versus   ovarian torsion. Tubo-ovarian abscess is favored as there appears to be   significant vascularity within the mass.    Abnormal amount of complex free fluid within the pelvis.    Normal uterus and left ovary.    < end of copied text > 13.0   14.04 )-----------( 229      ( 17 Dec 2018 03:45 )             38.3                11.4  12.26 )-----------( 196    (17 Dec 2018) 10:03             34.9         12-17    140  |  102  |  12  ----------------------------<  122<H>  4.1   |  25  |  0.6<L>    Ca    9.2      17 Dec 2018 03:45    TPro  6.7  /  Alb  4.1  /  TBili  0.4  /  DBili  <0.2  /  AST  18  /  ALT  12  /  AlkPhos  42  12-17    ESR 2    Urinalysis Basic - ( 17 Dec 2018 03:45 )    Color: Dark Yellow / Appearance: Cloudy / SG: >=1.030 / pH: x  Gluc: x / Ketone: 40  / Bili: Negative / Urobili: 0.2   Blood: x / Protein: Trace / Nitrite: Negative   Leuk Esterase: Trace / RBC: x / WBC 1-2 /HPF   Sq Epi: x / Non Sq Epi: x / Bacteria: Few /HPF    Urine pregnancy test negative      RADIOLOGY & ADDITIONAL STUDIES:  < from: US Pelvis Complete (12.17.18 @ 07:01) >  Findings:  Uterus: 8 cm long axis, 5.1 cm transverse, 3.7 cm AP.  Endometrial stripe: 0.8 mm.  The uterus is normal in morphology and echogenicity.     Within the right adnexa, the there is a complex mass measuring 5.2 x 4.1   x 3.4 cm with internal vascularity.    The left ovary measures 3.1 x 2.6 x 1.4 cm and contains subcentimeter   follicles.    There is an abnormal amount of complex free fluid within the pelvis.      Impression:    5.2cm complex right adnexal mass without visualization of a normal   ovary. Differential diagnosis includes a tubo-ovarian abscess versus   ovarian torsion. Tubo-ovarian abscess is favored as there appears to be   significant vascularity within the mass.    Abnormal amount of complex free fluid within the pelvis.    Normal uterus and left ovary.    < end of copied text >

## 2018-12-17 NOTE — H&P ADULT - NSHPPHYSICALEXAM_GEN_ALL_CORE
Vital Signs Last 24 Hrs  T(C): 36.4 (17 Dec 2018 15:03), Max: 37 (17 Dec 2018 05:02)  T(F): 97.5 (17 Dec 2018 12:40), Max: 98.6 (17 Dec 2018 05:02)  HR: 78 (17 Dec 2018 15:03) (68 - 78)  BP: 98/55 (17 Dec 2018 15:03) (98/55 - 102/63)  BP(mean): --  RR: 18 (17 Dec 2018 15:03) (18 - 18)  SpO2: 98% (17 Dec 2018 15:03) (98% - 98%)    Constitutional: NAD    Breasts: deferred    Respiratory: CTAB    Cardiovascular: NL S1/S2, no M/R/G    Gastrointestinal: soft, lower abdominal tenderness, worse in RLQ, nondistended, no rebound or rigidity    Pelvic: Normal vulva, normal vagina, no bleeding, Cervix normal, closed, no bleeding, no abnormal discharge, no CMT, GC/Chl and vaginitis cultures collected, Uterus anteverted, normal sized, moderately tender diffusely on bimanual exam

## 2018-12-17 NOTE — CHART NOTE - NSCHARTNOTEFT_GEN_A_CORE
PACU ANESTHESIA ADMISSION NOTE      Procedure: Diagnostic laparoscopy for adnexal masses  Evacuation of hemoperitoneum    Post op diagnosis:  Hemoperitoneum  Hemorrhagic cyst of right ovary      ____  Intubated  TV:______       Rate: ______      FiO2: ______    __x__  Patent Airway    ____  Full return of protective reflexes    ____  Full recovery from anesthesia / back to baseline status    Vitals:  T(C): 36.4   HR: 78   BP: 99/55  RR: 18   SpO2: 98%     Mental Status:  __x__ Awake   _____ Alert   _____ Drowsy   _____ Sedated    Nausea/Vomiting:  ____ Yes, See Post - Op Orders      __x__ No    Pain Scale (0-10):  _____    Treatment: ____ None    _x___ See Post - Op/PCA Orders    Post - Operative Fluids:   ____ Oral   ___x_ See Post - Op Orders    Plan: Discharge:   __x__Home       _____Floor     _____Critical Care    _____  Other:_________________    Comments:  report given to RN

## 2018-12-17 NOTE — CONSULT NOTE ADULT - ATTENDING COMMENTS
22 y/o G0 with RLQ pain. Initially accompanied by nausea, diarrhea and chills.   Patient improved since presentation and patent was found asleep in the room. Is able to ambulate w/o difficulty.  VSS. Abdomen: soft, lower abdominal tenderness, no rebound/ rigidity.  Leukocytosis.  US pelvis: right adnexal mass, likely TOA.  There is small amount of complex free fluid which I don't think is from ruptured TOA and rather represents surrounding inflammatory response/ exudate, given the patient's clinical picture.  A/P: Likely forming TOA.  Admit to Gyn. IVF, NPO, antibiotics, pain control.  Patient counseled. I explained if pain not responsive to antibiotics/ worsening of the clinical status might require diagnostic laparoscopy/ surgical intervention.  All questions answered. Patient verbalized understanding.

## 2018-12-17 NOTE — PRE-OP CHECKLIST - SELECT TESTS ORDERED
EKG/Urinalysis/HCG/CBC/CMP/BMP CBC/BMP/UPREG DONE IN ED, READ AS NEGATIVE BY ED RN/Urinalysis/EKG/CMP/HCG

## 2018-12-17 NOTE — CONSULT NOTE ADULT - ASSESSMENT
24yo nulligravid with RLQ pain and R ovarian mass and complex free pelvic fluid, ruptured hemorrhagic cyst vs TOA,  -repeat CBC  -lactate, CRP, ESR  -ct abd/pelvis to characterize the mass, r/o appendicitis  -abx for PID --> cefotetan, doxy, falgyl  -gyn to follow vaginitis panel and GC/CHl cultures    Dr Harrington 22yo nulligravid with RLQ pain and R ovarian mass and complex free pelvic fluid, ruptured hemorrhagic cyst vs TOA,  -repeat CBC  -lactate, CRP, ESR  -ct abd/pelvis to better characterize the mass and r/o appendicitis  -abx for PID --> cefotetan, doxy, falgyl  -gyn to follow vaginitis panel and GC/Chl cultures    Dr Harrington aware 22yo nulligravid with RLQ pain and R ovarian mass and complex free pelvic fluid, elevated WBC, likely TOA vs ruptured hemorrhagic cyst,  -repeat CBC  -lactate, CRP, ESR  -ct abd/pelvis to better characterize the mass and r/o appendicitis  -abx for PID --> cefotetan, doxy, falgyl  -gyn to follow vaginitis panel and GC/Chl cultures    Dr Harrington aware 22 y/o nulligravid with RLQ pain and R ovarian mass and complex free pelvic fluid, elevated WBC, likely TOA.  -repeat CBC  -lactate, CRP, ESR  -ct abd/pelvis to better characterize the mass and r/o appendicitis  -abx for PID --> cefotetan, doxy, falgyl  -gyn to follow vaginitis panel and GC/Chl cultures    Dr Harrington aware

## 2018-12-17 NOTE — ED ADULT NURSE REASSESSMENT NOTE - NS ED NURSE REASSESS COMMENT FT1
pt assessed A&Ox3, VSS, pt family member at bedside. pt states she still having lower abdominal pain at this time. plan of care reviewed with pt. safety and comfort measures maintained. will continue to monitor.

## 2018-12-18 LAB
C TRACH RRNA SPEC QL NAA+PROBE: SIGNIFICANT CHANGE UP
CRP SERPL-MCNC: 0.11 MG/DL — SIGNIFICANT CHANGE UP (ref 0–0.4)
N GONORRHOEA RRNA SPEC QL NAA+PROBE: SIGNIFICANT CHANGE UP
SPECIMEN SOURCE: SIGNIFICANT CHANGE UP

## 2018-12-20 PROBLEM — Z00.00 ENCOUNTER FOR PREVENTIVE HEALTH EXAMINATION: Status: ACTIVE | Noted: 2018-12-20

## 2018-12-21 LAB
A VAGINAE DNA VAG QL NAA+PROBE: SIGNIFICANT CHANGE UP
BVAB2 DNA VAG QL NAA+PROBE: SIGNIFICANT CHANGE UP
C ALBICANS DNA VAG QL NAA+PROBE: NEGATIVE — SIGNIFICANT CHANGE UP
C GLABRATA DNA VAG QL NAA+PROBE: NEGATIVE — SIGNIFICANT CHANGE UP
C KRUSEI DNA VAG QL NAA+PROBE: NEGATIVE — SIGNIFICANT CHANGE UP
C LUSITANIAE DNA VAG QL NAA+PROBE: NEGATIVE — SIGNIFICANT CHANGE UP
C PARAP DNA VAG QL NAA+PROBE: NEGATIVE — SIGNIFICANT CHANGE UP
C TRACH RRNA SPEC QL NAA+PROBE: NEGATIVE — SIGNIFICANT CHANGE UP
C TROPICLS DNA VAG QL NAA+PROBE: NEGATIVE — SIGNIFICANT CHANGE UP
MEGA1 DNA VAG QL NAA+PROBE: SIGNIFICANT CHANGE UP
N GONORRHOEA RRNA SPEC QL NAA+PROBE: NEGATIVE — SIGNIFICANT CHANGE UP
T VAGINALIS RRNA SPEC QL NAA+PROBE: NEGATIVE — SIGNIFICANT CHANGE UP

## 2018-12-27 ENCOUNTER — OUTPATIENT (OUTPATIENT)
Dept: OUTPATIENT SERVICES | Facility: HOSPITAL | Age: 23
LOS: 1 days | Discharge: HOME | End: 2018-12-27

## 2018-12-27 ENCOUNTER — APPOINTMENT (OUTPATIENT)
Dept: OBGYN | Facility: CLINIC | Age: 23
End: 2018-12-27

## 2018-12-27 VITALS
SYSTOLIC BLOOD PRESSURE: 100 MMHG | HEIGHT: 61 IN | WEIGHT: 114 LBS | BODY MASS INDEX: 21.52 KG/M2 | DIASTOLIC BLOOD PRESSURE: 80 MMHG

## 2018-12-27 DIAGNOSIS — Z98.890 OTHER SPECIFIED POSTPROCEDURAL STATES: ICD-10-CM

## 2018-12-28 DIAGNOSIS — Z48.816 ENCOUNTER FOR SURGICAL AFTERCARE FOLLOWING SURGERY ON THE GENITOURINARY SYSTEM: ICD-10-CM

## 2018-12-31 DIAGNOSIS — K66.1 HEMOPERITONEUM: ICD-10-CM

## 2018-12-31 DIAGNOSIS — R19.7 DIARRHEA, UNSPECIFIED: ICD-10-CM

## 2018-12-31 DIAGNOSIS — R10.9 UNSPECIFIED ABDOMINAL PAIN: ICD-10-CM

## 2018-12-31 DIAGNOSIS — N83.201 UNSPECIFIED OVARIAN CYST, RIGHT SIDE: ICD-10-CM

## 2019-10-02 NOTE — DISCHARGE NOTE ADULT - PATIENT PORTAL LINK FT
You can access the [facility] Patient Portal, offered by [facility], by registering with the following website: [email or web address]

## 2021-06-10 ENCOUNTER — TRANSCRIPTION ENCOUNTER (OUTPATIENT)
Age: 26
End: 2021-06-10

## 2021-07-23 ENCOUNTER — TRANSCRIPTION ENCOUNTER (OUTPATIENT)
Age: 26
End: 2021-07-23

## 2021-08-19 ENCOUNTER — TRANSCRIPTION ENCOUNTER (OUTPATIENT)
Age: 26
End: 2021-08-19

## 2022-07-25 NOTE — ED PROVIDER NOTE - ATTENDING CONTRIBUTION TO CARE
Impression: Primary open-angle glaucoma, indeterminate, bilateral: H40.4854. Plan: Discussed condition w/pt. OCT performed and reviewed. Recommend treatment, whether IOP lowering drops or SLT or more invasive surgery. Pt agrees, will move forward w/plan using external OMD.
RTC prn. This is a This is a 23yF no sig PMH p/w RLQ x 8hr.  No fevers, +nausea. No dysuria. One episode of diarrhea prior to initiation or pain.  Pain started periumbilical, now RLQ and suprapubic.    VSS  CONSTITUTIONAL: well developed; well nourished; well appearing in no acute distress  HEAD: normocephalic; atraumatic  EYES: no conjunctival injection, no scleral icterus  ENT: no nasal discharge; airway clear.  NECK: supple; non tender. + full passive ROM in all directions  CARD: S1, S2 normal; no murmurs, gallops, or rubs. Regular rate and rhythm  RESP: no wheezes, rales or rhonchi. Good air movement bilaterally without significant accessory muscle use  ABD: soft; non-distended; +RLQ pain, no rebound, no guarding, no pulsatile abdominal mass  EXT: moving all extremities spontaneously, normal ROM. No clubbing, cyanosis or edema  SKIN: warm and dry, no lesions noted  NEURO: alert, oriented, CN II-XII grossly intact, motor and sensory grossly intact, speech nonslurred, no focal deficits. GCS 15  PSYCH: calm, cooperative, appropriate, good eye contact, logical thought process, no apparent danger to self or others    labs  UA  US  may need CT A/P

## 2023-01-13 ENCOUNTER — EMERGENCY (EMERGENCY)
Facility: HOSPITAL | Age: 28
LOS: 0 days | Discharge: HOME | End: 2023-01-13
Attending: EMERGENCY MEDICINE | Admitting: EMERGENCY MEDICINE
Payer: SELF-PAY

## 2023-01-13 VITALS
TEMPERATURE: 96 F | OXYGEN SATURATION: 98 % | SYSTOLIC BLOOD PRESSURE: 110 MMHG | RESPIRATION RATE: 18 BRPM | DIASTOLIC BLOOD PRESSURE: 73 MMHG | HEART RATE: 98 BPM | WEIGHT: 115.08 LBS

## 2023-01-13 VITALS
RESPIRATION RATE: 18 BRPM | TEMPERATURE: 98 F | HEART RATE: 93 BPM | SYSTOLIC BLOOD PRESSURE: 114 MMHG | OXYGEN SATURATION: 99 % | DIASTOLIC BLOOD PRESSURE: 65 MMHG

## 2023-01-13 DIAGNOSIS — J03.90 ACUTE TONSILLITIS, UNSPECIFIED: ICD-10-CM

## 2023-01-13 DIAGNOSIS — J02.9 ACUTE PHARYNGITIS, UNSPECIFIED: ICD-10-CM

## 2023-01-13 DIAGNOSIS — R50.9 FEVER, UNSPECIFIED: ICD-10-CM

## 2023-01-13 LAB
ALBUMIN SERPL ELPH-MCNC: 4.1 G/DL — SIGNIFICANT CHANGE UP (ref 3.5–5.2)
ALP SERPL-CCNC: 70 U/L — SIGNIFICANT CHANGE UP (ref 30–115)
ALT FLD-CCNC: 8 U/L — SIGNIFICANT CHANGE UP (ref 0–41)
ANION GAP SERPL CALC-SCNC: 13 MMOL/L — SIGNIFICANT CHANGE UP (ref 7–14)
AST SERPL-CCNC: 15 U/L — SIGNIFICANT CHANGE UP (ref 0–41)
BASOPHILS # BLD AUTO: 0.06 K/UL — SIGNIFICANT CHANGE UP (ref 0–0.2)
BASOPHILS NFR BLD AUTO: 0.4 % — SIGNIFICANT CHANGE UP (ref 0–1)
BILIRUB SERPL-MCNC: 0.4 MG/DL — SIGNIFICANT CHANGE UP (ref 0.2–1.2)
BUN SERPL-MCNC: 9 MG/DL — LOW (ref 10–20)
CALCIUM SERPL-MCNC: 9.4 MG/DL — SIGNIFICANT CHANGE UP (ref 8.4–10.5)
CHLORIDE SERPL-SCNC: 101 MMOL/L — SIGNIFICANT CHANGE UP (ref 98–110)
CO2 SERPL-SCNC: 23 MMOL/L — SIGNIFICANT CHANGE UP (ref 17–32)
CREAT SERPL-MCNC: 0.6 MG/DL — LOW (ref 0.7–1.5)
EGFR: 127 ML/MIN/1.73M2 — SIGNIFICANT CHANGE UP
EOSINOPHIL # BLD AUTO: 0.14 K/UL — SIGNIFICANT CHANGE UP (ref 0–0.7)
EOSINOPHIL NFR BLD AUTO: 0.9 % — SIGNIFICANT CHANGE UP (ref 0–8)
GLUCOSE SERPL-MCNC: 97 MG/DL — SIGNIFICANT CHANGE UP (ref 70–99)
HCG SERPL QL: NEGATIVE — SIGNIFICANT CHANGE UP
HCT VFR BLD CALC: 39.8 % — SIGNIFICANT CHANGE UP (ref 37–47)
HGB BLD-MCNC: 13.3 G/DL — SIGNIFICANT CHANGE UP (ref 12–16)
IMM GRANULOCYTES NFR BLD AUTO: 0.6 % — HIGH (ref 0.1–0.3)
LYMPHOCYTES # BLD AUTO: 1.8 K/UL — SIGNIFICANT CHANGE UP (ref 1.2–3.4)
LYMPHOCYTES # BLD AUTO: 10.9 % — LOW (ref 20.5–51.1)
MCHC RBC-ENTMCNC: 29.4 PG — SIGNIFICANT CHANGE UP (ref 27–31)
MCHC RBC-ENTMCNC: 33.4 G/DL — SIGNIFICANT CHANGE UP (ref 32–37)
MCV RBC AUTO: 87.9 FL — SIGNIFICANT CHANGE UP (ref 81–99)
MONOCYTES # BLD AUTO: 1.59 K/UL — HIGH (ref 0.1–0.6)
MONOCYTES NFR BLD AUTO: 9.7 % — HIGH (ref 1.7–9.3)
NEUTROPHILS # BLD AUTO: 12.76 K/UL — HIGH (ref 1.4–6.5)
NEUTROPHILS NFR BLD AUTO: 77.5 % — HIGH (ref 42.2–75.2)
NRBC # BLD: 0 /100 WBCS — SIGNIFICANT CHANGE UP (ref 0–0)
PLATELET # BLD AUTO: 190 K/UL — SIGNIFICANT CHANGE UP (ref 130–400)
POTASSIUM SERPL-MCNC: 3.8 MMOL/L — SIGNIFICANT CHANGE UP (ref 3.5–5)
POTASSIUM SERPL-SCNC: 3.8 MMOL/L — SIGNIFICANT CHANGE UP (ref 3.5–5)
PROT SERPL-MCNC: 7 G/DL — SIGNIFICANT CHANGE UP (ref 6–8)
RBC # BLD: 4.53 M/UL — SIGNIFICANT CHANGE UP (ref 4.2–5.4)
RBC # FLD: 12.2 % — SIGNIFICANT CHANGE UP (ref 11.5–14.5)
SODIUM SERPL-SCNC: 137 MMOL/L — SIGNIFICANT CHANGE UP (ref 135–146)
WBC # BLD: 16.45 K/UL — HIGH (ref 4.8–10.8)
WBC # FLD AUTO: 16.45 K/UL — HIGH (ref 4.8–10.8)

## 2023-01-13 PROCEDURE — 70491 CT SOFT TISSUE NECK W/DYE: CPT | Mod: 26,MA

## 2023-01-13 PROCEDURE — 99284 EMERGENCY DEPT VISIT MOD MDM: CPT

## 2023-01-13 RX ORDER — ACETAMINOPHEN 500 MG
650 TABLET ORAL ONCE
Refills: 0 | Status: COMPLETED | OUTPATIENT
Start: 2023-01-13 | End: 2023-01-13

## 2023-01-13 RX ORDER — SODIUM CHLORIDE 9 MG/ML
1000 INJECTION, SOLUTION INTRAVENOUS ONCE
Refills: 0 | Status: COMPLETED | OUTPATIENT
Start: 2023-01-13 | End: 2023-01-13

## 2023-01-13 RX ORDER — DEXAMETHASONE 0.5 MG/5ML
10 ELIXIR ORAL ONCE
Refills: 0 | Status: COMPLETED | OUTPATIENT
Start: 2023-01-13 | End: 2023-01-13

## 2023-01-13 RX ADMIN — SODIUM CHLORIDE 1000 MILLILITER(S): 9 INJECTION, SOLUTION INTRAVENOUS at 18:21

## 2023-01-13 RX ADMIN — Medication 10 MILLIGRAM(S): at 17:08

## 2023-01-13 RX ADMIN — Medication 600 MILLIGRAM(S): at 18:34

## 2023-01-13 RX ADMIN — Medication 650 MILLIGRAM(S): at 16:15

## 2023-01-13 RX ADMIN — Medication 100 MILLIGRAM(S): at 17:08

## 2023-01-13 RX ADMIN — SODIUM CHLORIDE 1000 MILLILITER(S): 9 INJECTION, SOLUTION INTRAVENOUS at 21:38

## 2023-01-13 NOTE — ED PROVIDER NOTE - CLINICAL SUMMARY MEDICAL DECISION MAKING FREE TEXT BOX
Young female with sore throat for 3 days.  Labs and imaging obtained, tonsillitis versus early abscess formation.  Analgesia/fluids/antibiotics/Decadron was administered, patient reported improvement in her symptoms, exam is reassuring, management was discussed with ENT.  Prescription for antibiotic was sent to the patient's pharmacy. Young female with sore throat for 3 days.  Labs and imaging obtained, tonsillitis versus early abscess formation.  Analgesia/fluids/antibiotics/Decadron was administered, patient reported improvement in her symptoms, exam is reassuring, management was discussed with ENT.  Prescription for antibiotic was sent to the patient's pharmacy. Management was discussed with ENT, patient stable for discharge home now advised to follow-up with ENT outpatient, advised return to the emergency room immediately for any worsening symptoms.

## 2023-01-13 NOTE — ED PROVIDER NOTE - PATIENT PORTAL LINK FT
You can access the FollowMyHealth Patient Portal offered by [facility] by registering at the following website: [email or web address]. By joining [employer]’s FollowMyHealth portal, you will also be able to view your health information using other applications (apps) compatible with our system.

## 2023-01-13 NOTE — ED PROVIDER NOTE - NSFOLLOWUPINSTRUCTIONS_ED_ALL_ED_FT
Sore Throat  A sore throat is pain, burning, irritation, or scratchiness in the throat. When you have a sore throat, you may feel pain or tenderness in your throat when you swallow or talk.  Many things can cause a sore throat, including:      .Drink enough fluids to keep your urine pale yellow.Rest as needed.To help with pain, try:  Sipping warm liquids, such as broth, herbal tea, or warm water.Eating or drinking cold or frozen liquids, such as frozen ice pops.Gargling with a salt-water mixture 3–4 times a day or as needed. To make a salt-water mixture, completely dissolve ½–1 tsp (3–6 g) of salt in 1 cup (237 mL) of warm water.Sucking on hard candy or throat lozenges.  You have a fever for more than 2–3 days.You have symptoms that last (are persistent) for more than 2–3 days.Your throat does not get better within 7 days.You have a fever and your symptoms suddenly get worse.  You have difficulty breathing.You cannot swallow fluids, soft foods, or your saliva.You have increased swelling in your throat or neck.You have persistent nausea and vomiting.Summary  A sore throat is pain, burning, irritation, or scratchiness in the throat. Many things can cause a sore throat.Take over-the-counter medicines only as told by your health care provider. Do not give your child aspirin.Drink plenty of fluids, and rest as needed.Contact a health care provider if your symptoms worsen or your sore throat does not get better within 7 days.This information is not intended to replace advice given to you by your health care provider. Make sure you discuss any questions you have with your health care provider.  Please follow-up with ENT, call for an appointment.  Return to emergency room immediately for any worsening symptoms.

## 2023-01-13 NOTE — ED PROVIDER NOTE - OBJECTIVE STATEMENT
Patient is a 37y F no pmhx presenting for 3 day hx of sore throat and fever starting on the R side. Patient seen by PMD and sent to the ED for evaluation for concern of PTA. Patient denies difficulty breathing, cough, shortness of breath. Patient states she has some pain with swallowing but is able to eat and drink.

## 2023-01-13 NOTE — ED PROVIDER NOTE - ATTENDING CONTRIBUTION TO CARE
27-year-old female without any pertinent past medical history presented for evaluation of fever for the past 3 days associated with sore throat.  Pain started on the right side, now bilateral.  She went to see her pediatrician who sent her to the emergency room.  Denies any cough, trouble breathing, headache or any other additional complaints.  Well-appearing female in no acute distress, PERRL, pink conjunctiva, MMM, normal phonation, exam of the oropharynx shows enlarged rt tonsil with erythema and exudate, uvula is midline, no drooling or trismus, normal ear exam, supple neck, normal work of breathing, lungs clear to auscultation, abdomen soft nontender to palpation, patient is awake and alert x3, no gross neurodeficits.  Impression/plan: Most likely tonsillitis vs early PTA; will provide analgesia, start antibiotics, give a dose of Decadron, imaging rule out abscess reassess.

## 2023-01-13 NOTE — ED PROVIDER NOTE - CARE PROVIDER_API CALL
True White)  Formerly named [facility]  [address] [place] [address]  Phone: [phone]  Fax: [phone]  Follow Up Time: 4-6 Days

## 2023-01-13 NOTE — ED ADULT NURSE NOTE - OBJECTIVE STATEMENT
Pt Pt presents to the ed c/o throat pain, fever, swollen rt lymph node, difficulty swallowing and difficulty eating. Pt was noted to have swollen rt tonsil with white plaque. Uvula is midline and pink. No acute respiratory distress was noted or drooling.

## 2023-01-13 NOTE — CONSULT NOTE ADULT - SUBJECTIVE AND OBJECTIVE BOX
ENT CONSULT:  Pt is a 26yo F with no significant PMHx presenting to the ED c/o throat pain. Pt seen and examined at bedside in the ED. Pt first noted throat pain on Wednesday and has been progressively worsening. Pt went to PCP today was was told to present to the ED for further evaluation. CT Neck with findings c/w b/l tonsilitis (R>L), no drainable fluid collection. Pt states she has been able to tolerate PO, but does have pain with swallowing. In ED pt afebrile, non-toxic appearing.     PAST MEDICAL & SURGICAL HISTORY:  No pertinent past medical history  No significant past surgical history    MEDICATIONS  (STANDING):    MEDICATIONS  (PRN):    Allergies  No Known Allergies    SOCIAL HISTORY:    FAMILY HISTORY:  No pertinent family history in first degree relatives      REVIEW OF SYSTEMS   [x] A ten-point review of systems was otherwise negative except as noted.    Vital Signs Last 24 Hrs  T(C): 36.7 (13 Jan 2023 16:45), Max: 36.7 (13 Jan 2023 16:45)  T(F): 98 (13 Jan 2023 16:45), Max: 98 (13 Jan 2023 16:45)  HR: 93 (13 Jan 2023 16:45) (93 - 98)  BP: 114/65 (13 Jan 2023 16:45) (110/73 - 114/65)  RR: 18 (13 Jan 2023 16:45) (18 - 18)  SpO2: 99% (13 Jan 2023 16:45) (98% - 99%)    Parameters below as of 13 Jan 2023 13:29  Patient On (Oxygen Delivery Method): room air      GEN: NAD. No drooling or pooling of secretions. Good vocal quality.  NEURO: Awake and alert   SKIN: Good color, non diaphoretic  HEENT: No trismus. Oral mucosa pink and moist. 3+ tonsils, ++exudate noted to R tonsil, No bulging appreciated to peritonsillar area   RESP: Non-labored breathing  CARDIO: +S1/S2  ABDO: Soft, NT  EXT: SWAN x 4    LABS:                        13.3   16.45 )-----------( 190      ( 13 Jan 2023 16:51 )             39.8     01-13    137  |  101  |  9<L>  ----------------------------<  97  3.8   |  23  |  0.6<L>    Ca    9.4      13 Jan 2023 16:51    TPro  7.0  /  Alb  4.1  /  TBili  0.4  /  DBili  x   /  AST  15  /  ALT  8   /  AlkPhos  70  01-13

## 2023-01-13 NOTE — ED ADULT TRIAGE NOTE - CHIEF COMPLAINT QUOTE
pt sts has fever subjectivex 3 days with swollen gland. was seen by PMD for abscess to throat for iv abx. sts change in voice, ni dif breathing

## 2023-01-13 NOTE — CONSULT NOTE ADULT - ASSESSMENT
Pt is a 28yo F with no significant PMHx with b/l tonsilitis (R>L).     ·	Pt seen and examined in the ED s/p IV clindamycin;  afebrile, non-toxic appearing  ·	Cont Abx   ·	Medrol dose pack for symptomatic relief   ·	f/u strep cx    ·	Return precautions given to pt and pt's mother including difficulty breathing, inability to tolerate PO  ·	f/u with ENT as outpatient, will email office for f/u appointment; pt and pt's mother aware to call office as well.   ·	s/w ED team

## 2023-01-13 NOTE — ED PROVIDER NOTE - PHYSICAL EXAMINATION
Vital Signs: I have reviewed the initial vital signs.  Constitutional: well-nourished, appears stated age, no acute distress  HEENT: Uvula midline, BL tonsillar fullness R > L , with erythema and exudates. No drooling, no voice changes.   Cardiovascular: regular rate, regular rhythm,   Respiratory: unlabored respiratory effort, clear to auscultation bilaterally  Gastrointestinal: soft, non-tender abdomen,

## 2023-01-13 NOTE — ED PROVIDER NOTE - DISCHARGE DATE
Sleep Study Documentation    Pre-Sleep Study       Sleep testing procedure explained to patient:YES    Patient napped prior to study:YES- more than 30 minutes  Napped after 2PM: yes    Caffeine:Dayshift worker after 12PM   Caffeine use:YES- soda  12 ounces    Alcohol:Dayshift workers after 5PM: Alcohol use:NO    Typical day for patient:YES       Study Documentation    Sleep Study Indications:     Sleep Study: Diagnostic   Snore:Mild  Supplemental O2: no    O2 flow rate (L/min) range   O2 flow rate (L/min) final   Minimum SaO2 87%  Baseline SaO2 95 7%          EKG abnormalities: no     EEG abnormalities: no    Sleep Study Recorded < 2 hours: N/A    Sleep Study Recorded > 2 hours but incomplete study: N/A    Sleep Study Recorded 6 hours but no sleep obtained: NO    Patient classification: employed       Post-Sleep Study    Medication used at bedtime or during sleep study:YES other prescription medications    Patient reports time it took to fall asleep:30 to 60 minutes    Patient reports waking up during study:1 to 2 times  Patient reports returning to sleep in 10 to 30 minutes  Patient reports sleeping 4 to 6 hours with dreaming  Patient reports sleep during study:worse than usual    Patient rated sleepiness: Somewhat sleepy or tired    PAP treatment:no 
13-Jan-2023

## 2023-01-13 NOTE — ED PROVIDER NOTE - PROGRESS NOTE DETAILS
Resident AO: Patient received as sign out from Dr. Berry, pending CT of neck. On assessment, patient not in acute distress, exam with patent airway with bilateral tonsilar swelling, with exudates on the right. CT with "palatine tonsillitis (right worse than left) with early abscess formation in the right, but no drainable rim-enhancing fluid collection." ENT consult obtained (spoke with Nargis). EP: The patient appears very well, reports feeling better with antibiotics fluids and Decadron.  CT of the neck shows possible early abscess formation, ENT was consulted.  Patient and mom were made aware of the results and plan. EP: The patient was evaluated by ENT, no acute intervention at present, prescription for clindamycin sent to her pharmacy, advised to follow-up with ENT in the office in the next several days, strict return precautions given. Patient to be discharged from ED. Any available test results were discussed with patient and/or family. Verbal instructions given, including instructions to return to ED immediately for any new, worsening, or concerning symptoms. Patient endorsed understanding. Written discharge instructions additionally given, including follow-up plan.  Patient was given opportunity to ask questions.

## 2023-01-30 ENCOUNTER — APPOINTMENT (OUTPATIENT)
Dept: OTOLARYNGOLOGY | Facility: CLINIC | Age: 28
End: 2023-01-30

## 2023-06-20 NOTE — PACU DISCHARGE NOTE - NS MD DISCHARGE NOTE DISCHARGE
"Anesthesia Transfer of Care Note    Patient: Sujatha Julien    Procedure(s) Performed: Procedure(s) (LRB):  CHOLECYSTECTOMY, LAPAROSCOPIC (N/A)    Patient location: PACU    Anesthesia Type: general    Transport from OR: Transported from OR on room air with adequate spontaneous ventilation    Post pain: adequate analgesia    Post assessment: no apparent anesthetic complications and tolerated procedure well    Post vital signs: stable    Level of consciousness: responds to stimulation    Nausea/Vomiting: no nausea/vomiting    Complications: none    Transfer of care protocol was followed      Last vitals:   Visit Vitals  BP (!) 146/69 (BP Location: Right arm, Patient Position: Sitting)   Pulse 69   Temp 36.4 °C (97.6 °F)   Resp 18   Ht 5' 6" (1.676 m)   Wt 80.7 kg (178 lb)   SpO2 (!) 93%   Breastfeeding No   BMI 28.73 kg/m²     " Floor

## 2024-01-17 NOTE — DISCHARGE NOTE ADULT - PLAN OF CARE
healthy recovery keep incision clean and dry, wash with soap and water, take ibuprofen or percocet every 4-6 hours as needed for pain Admitted